# Patient Record
Sex: FEMALE | Race: WHITE | NOT HISPANIC OR LATINO
[De-identification: names, ages, dates, MRNs, and addresses within clinical notes are randomized per-mention and may not be internally consistent; named-entity substitution may affect disease eponyms.]

---

## 2020-11-09 DIAGNOSIS — R41.3 OTHER AMNESIA: ICD-10-CM

## 2020-11-19 ENCOUNTER — NON-APPOINTMENT (OUTPATIENT)
Age: 80
End: 2020-11-19

## 2020-11-19 DIAGNOSIS — Z82.49 FAMILY HISTORY OF ISCHEMIC HEART DISEASE AND OTHER DISEASES OF THE CIRCULATORY SYSTEM: ICD-10-CM

## 2020-11-19 DIAGNOSIS — Z82.0 FAMILY HISTORY OF EPILEPSY AND OTHER DISEASES OF THE NERVOUS SYSTEM: ICD-10-CM

## 2020-11-19 DIAGNOSIS — E78.5 HYPERLIPIDEMIA, UNSPECIFIED: ICD-10-CM

## 2020-11-19 DIAGNOSIS — Z80.7 FAMILY HISTORY OF OTHER MALIGNANT NEOPLASMS OF LYMPHOID, HEMATOPOIETIC AND RELATED TISSUES: ICD-10-CM

## 2020-11-19 DIAGNOSIS — R32 UNSPECIFIED URINARY INCONTINENCE: ICD-10-CM

## 2020-11-19 DIAGNOSIS — F43.9 REACTION TO SEVERE STRESS, UNSPECIFIED: ICD-10-CM

## 2020-11-19 DIAGNOSIS — I48.0 PAROXYSMAL ATRIAL FIBRILLATION: ICD-10-CM

## 2020-11-19 DIAGNOSIS — Z85.89 PERSONAL HISTORY OF MALIGNANT NEOPLASM OF OTHER ORGANS AND SYSTEMS: ICD-10-CM

## 2020-11-19 DIAGNOSIS — R35.0 FREQUENCY OF MICTURITION: ICD-10-CM

## 2021-01-15 ENCOUNTER — APPOINTMENT (OUTPATIENT)
Dept: NEUROLOGY | Facility: CLINIC | Age: 81
End: 2021-01-15
Payer: MEDICARE

## 2021-01-15 VITALS
HEIGHT: 63 IN | HEART RATE: 64 BPM | DIASTOLIC BLOOD PRESSURE: 81 MMHG | SYSTOLIC BLOOD PRESSURE: 152 MMHG | BODY MASS INDEX: 35.79 KG/M2 | WEIGHT: 202 LBS

## 2021-01-15 VITALS — TEMPERATURE: 97.5 F

## 2021-01-15 PROCEDURE — 99213 OFFICE O/P EST LOW 20 MIN: CPT

## 2021-01-15 NOTE — CONSULT LETTER
[Dear  ___] : Dear  [unfilled], [Consult Letter:] : I had the pleasure of evaluating your patient, [unfilled]. [Please see my note below.] : Please see my note below. [Consult Closing:] : Thank you very much for allowing me to participate in the care of this patient.  If you have any questions, please do not hesitate to contact me. [Sincerely,] : Sincerely, [FreeTextEntry3] : .Signature

## 2021-01-15 NOTE — HISTORY OF PRESENT ILLNESS
[FreeTextEntry1] : Mrs. Renetta Cohen was last evaluated on August 3, 2020 via telemedicine. She is an 80-year-old right-handed patient with familial tremor, atrial fibrillation, prediabetes, hyperlipidemia and mild subacute amnestic symptoms. Imaging revealed chronic microvascular ischemic changes and a right frontal calvarial lesion. This prompted a follow-up MRI of the brain performed in mid September 2019. That study revealed an unchanged enhancing lesion in the right frontal bone possibly representing a hemangioma. A six-month follow-up was recommended.\par \par Mrs. Cohen complains of worsening anxiety, more memory loss and tremor.\par \par She is presently on metoprolol 25 mg daily, Xarelto, donepezil 5 mg daily, losartan and pravastatin.  She takes HCTZ as needed for lower extremity edema.

## 2021-01-15 NOTE — DATA REVIEWED
[de-identified] : Blood tests were reviewed from October 28, 2020.  Hemoglobin was 11.8.  Sedimentation rate was 30.  Creatinine was 1.29.  Glucose was 147.  Hepatic functions were normal.  CPK was 73. LDL was 120.  Hemoglobin A1c was 6.7.  Vitamin B12 was 597.  TSH was 3.14.  C-reactive protein was 2.58.  Covid–19 IgG antibody was negative.

## 2021-01-15 NOTE — ASSESSMENT
[FreeTextEntry1] : Mrs. Cohen is an 80-year-old with mild cognitive impairment, amnestic type, severe anxiety and an essential or possibly familial tremor.  Her calvarial lesion is likely benign.  She declines updated imaging.  I cannot entirely exclude a diagnosis of Parkinson's disease but this seems less likely.  I suspect that her cognitive complaints are due to a combination of severe anxiety and possibly cerebrovascular disease.  Another neurodegenerative process cannot be totally excluded.\par \par She is hesitant to increase her donepezil dose.  I suggested cautious observation.  She seems to be functioning rather well under the circumstances of the pandemic.  I suggested a routine follow-up visit in 6 months.  Telephone contact will be maintained in the meantime.

## 2021-01-15 NOTE — PHYSICAL EXAM
[FreeTextEntry1] : Constitutional:  Patient was well-developed, well-nourished and in no acute distress. \par \par Head:  Normocephalic, atraumatic. Tympanic membranes were clear. \par \par Neck:  Supple with full range of motion. \par \par Cardiovascular:  Cardiac rhythm was regular without murmur. There were no carotid bruits. Peripheral pulses were full and symmetric. \par \par Respiratory:  Lungs were clear. \par \par Abdomen:  Soft and nontender. \par \par Spine:  Nontender. \par \par Skin:  There were no rashes. \par \par NEUROLOGICAL EXAMINATION:\par \par Mental Status: Patient was alert and oriented. Speech was fluent. There was a mild vocal tremor.  She scored 27 out of 30 on MMSE making 3 errors in recall.  She scored 29 out of 30 at her last visit.\par \par Cranial Nerves: \par \par II: She could finger count bilaterally. Pupils were equal and reactive. Visual fields were full. Funduscopic examination was normal. \par \par III, IV, VI:  Eye movements were full without nystagmus. \par \par V: Facial sensation was intact. \par \par VII: Facial strength was normal. \par \par VIII: Hearing was equal. \par \par IX, X: Palatal movement was normal. Phonation was normal. \par \par XI: Sternocleidomastoids and trapezii were normal. \par \par XII: Tongue was midline and movements normal. There was no lingual atrophy or fasciculations. \par \par Motor Examination: Muscle bulk, tone and strength were normal.  There were very prominent right greater than left postural and at times rest tremors.  There were also action tremors.\par \par Sensory Examination: Pinprick, vibration and joint position sense were intact. \par \par Reflexes: DTRs were 2 throughout. \par \par Plantar Responses: Plantar responses were flexor. \par \par Coordination/Cerebellar Function: There was no dysmetria on finger to nose or heel to shin testing. \par \par Gait/Stance: Gait small stepped and turns decomposed.  Romberg was negative.  Tandem was not tested.\par

## 2021-10-22 ENCOUNTER — APPOINTMENT (OUTPATIENT)
Dept: NEUROLOGY | Facility: CLINIC | Age: 81
End: 2021-10-22

## 2021-12-22 ENCOUNTER — APPOINTMENT (OUTPATIENT)
Dept: NEUROLOGY | Facility: CLINIC | Age: 81
End: 2021-12-22
Payer: MEDICARE

## 2021-12-22 VITALS
HEIGHT: 63 IN | DIASTOLIC BLOOD PRESSURE: 82 MMHG | WEIGHT: 203 LBS | SYSTOLIC BLOOD PRESSURE: 140 MMHG | BODY MASS INDEX: 35.97 KG/M2 | HEART RATE: 66 BPM

## 2021-12-22 DIAGNOSIS — Z86.73 PERSONAL HISTORY OF TRANSIENT ISCHEMIC ATTACK (TIA), AND CEREBRAL INFARCTION W/OUT RESIDUAL DEFICITS: ICD-10-CM

## 2021-12-22 DIAGNOSIS — D18.00 HEMANGIOMA UNSPECIFIED SITE: ICD-10-CM

## 2021-12-22 DIAGNOSIS — I10 ESSENTIAL (PRIMARY) HYPERTENSION: ICD-10-CM

## 2021-12-22 DIAGNOSIS — E11.9 TYPE 2 DIABETES MELLITUS W/OUT COMPLICATIONS: ICD-10-CM

## 2021-12-22 DIAGNOSIS — R45.4 IRRITABILITY AND ANGER: ICD-10-CM

## 2021-12-22 DIAGNOSIS — R47.89 OTHER SPEECH DISTURBANCES: ICD-10-CM

## 2021-12-22 PROCEDURE — 99215 OFFICE O/P EST HI 40 MIN: CPT

## 2021-12-22 PROCEDURE — 96116 NUBHVL XM PHYS/QHP 1ST HR: CPT | Mod: 59

## 2021-12-22 PROCEDURE — 95806 SLEEP STUDY UNATT&RESP EFFT: CPT

## 2021-12-22 RX ORDER — METFORMIN HYDROCHLORIDE 625 MG/1
TABLET ORAL
Refills: 0 | Status: COMPLETED | COMMUNITY
End: 2021-12-22

## 2021-12-22 RX ORDER — B-COMPLEX WITH VITAMIN C
TABLET ORAL
Refills: 0 | Status: ACTIVE | COMMUNITY

## 2021-12-22 RX ORDER — DONEPEZIL HYDROCHLORIDE 5 MG/1
5 TABLET ORAL DAILY
Qty: 90 | Refills: 0 | Status: COMPLETED | COMMUNITY
Start: 2020-11-09 | End: 2021-12-22

## 2021-12-22 RX ORDER — DONEPEZIL HYDROCHLORIDE 5 MG/1
5 TABLET ORAL
Qty: 90 | Refills: 3 | Status: COMPLETED | COMMUNITY
Start: 2021-02-08 | End: 2021-12-22

## 2021-12-22 RX ORDER — PRAVASTATIN SODIUM 80 MG/1
TABLET ORAL
Refills: 0 | Status: COMPLETED | COMMUNITY
End: 2021-12-22

## 2021-12-22 RX ORDER — ADHESIVE TAPE 3"X 2.3 YD
50 MCG TAPE, NON-MEDICATED TOPICAL
Refills: 0 | Status: ACTIVE | COMMUNITY

## 2021-12-22 NOTE — ASSESSMENT
[FreeTextEntry1] : The patient is an 81 year old right handed woman with an episode of rapid forgetting in 2016 that was possibly associated with other mild chronic memory changes vs isolated incident, but since 2018 more clear cognitive decline noted primarily affecting memory. Personality more withdrawn, less sure of herself as well.\par B/l hand tremor is more longstanding since approx 2005.\par As of 12'21 mild decline in IADLs.\par On neurobehavioral status testing 12'21 she had deficits in working memory, executive function, processing speed overall seemed ok. Conversational memory was good but on testing very impaired registration, dec recall, but it fully responded to cues without false positives and she was fully oriented. sematnic fluency however was worse than phonemic and she had anomia in conversation and testing that responded to cues. No apraxia aside from a meaningless gesture. \par \par Counseled that she has dementia of mild stage. Cogntiive pattern seems more dysexecutive and not amnestic, though language changes noted. Suspect vascular dementia as etiology, think lower suspicion for comorbid AD, but i rec neuropsyhcological testing to better clarify. MRI shows severe microvascular ischemic disease as well as R BG infarct which we reviewed together. \par \par counseled on lack of fda approved drugs in vascular dementia. Aricept he doestn think caused any benefit. Discussed that theres inc risk of GIB in conjunction with her xarelto. They prefer to discontinue which i agree with.\par \par She snores rec r/o CASTRO\par \par Re: tremor, has been dx'ed as essential tremor which seems fitting, though some rest tremor was also noted. She doesn’t have any parkinsonian gait or bradykinesia or visual hallucinations or rem sleep behavior disorder symptomst that would suggest she instead as dementia with lewy bodies. Essential tremor can also contribute to cognitive changes.\par \par She may have ckd which could also contribute.\par \par She has longstanding anxiety, worse in recent years, not on medication or in therapy. Discussed how that could worsen symptoms and needs to be adddressed. \par \par Counseled on strategies for maintaining cognitive health. \par

## 2021-12-22 NOTE — PROCEDURE
[FreeTextEntry1] : EXTENDED NEUROBEHAVIORAL STATUS TESTING\par \par [This is a separate procedure note for the Neurobehavioral Status Examination that was performed during the encounter]. \par \par The patient was alert, well groomed, NAD. She was fluent without paraphasic errors. there was some anomia in conversation with circumlocutions at times. Comprehension was intact. processing wasn’t definitely slowed in converation. she was active in the diuscssion and give her history. she was euthymic overall but had test anxiety noted. \par she had tremor noted with action >rest but it increaed during cognigive testing.\par she asked appropr questions during counseling\par \par lost train of thought once in conversation\par can say has 13 grandkids. asked to name youngest 3 and she could\par \par recent memory: chose omicron from list, couldn’t get with cue. could name covid without cues. weekend events stayed home, could give some specifics. could name biden. last holiday knew was thansgiving, didn’t celebrate.\par \par MoCA (version 8.1) score out of 30: 14\par Memory index score (MIS) out of 15: 8\par Visuospatial/Executive \par 	Trails: (-1) tremor noted\par 	Cube: (-1) tremor noted, piecemeal, too close to copy\par 	Clock: (-2) small clock, numbers shifted only fit up to 7. when drawn for her she put line connected 10 and 11\par Naming: (-1) rhino got with phonemic cue\par Memory- registration: impared. 1/5, 2/5 then did indivi butstill only 3/5 best\par Attention \par 	Digit span 5F: intact\par 	Digit span 3R: intact\par 	Letter A test: intact 1 miss\par 	Serial 7 subtraction: (-3) 0/5\par Language \par 	Phrase repetition: (-2)\par 	F word fluency- # words: (-1) 10 and 2 rpts and 1 rule violation phone\par Abstraction- banana/orange not similar- with promtp she got fruit\par 	Train/bicycle: (-1) move\par 	Watch/ruler: measurement\par Delayed recall score out of 5: (-4) 1\par 	Additional words recalled with category cue: 1\par 	Additional words recalled with multiple choice cue: 3\par Orientation: intact including street number but not name. for yr said 2 2 2 1 and looked like she knew she was wrong, but when given choices she picked 2021 so i gave credit\par \par Additional neurobehavioral status tests:\par Animal naming fluency- # words: 4 and 1 rpt\par Sentence written: Im writing a sentence [messy with tremor, not micrographic]\par Praxis\par       Ideomotor limb\par             Transitive\par                     Comb hair: intact b/l\par 	    Brush teeth: intact b/l\par             Intransitive\par 	    Wave goodbye: intact b/l\par 	    Motion "come here": intact b/l\par       Meaningless gestures: intact 3/4 (not crossed fingers)\par Right/Left orientation\par 	"Show me your right hand": correct \par 	"Show me your left hand": correct \par 	"With your right hand touch your left ear": correct\par 	wheres my left shoulder: correct\par \par INTERPRETATION: \par \par I carefully reviewed the above results of neurobehavioral status testing. The cognitive domains are listed below with my interpretation of whether or not there is an impairment or if performance was within normal limits. \par It should be noted that this testing is distinct from standard neuropsychological testing, which compares the patient's raw scores on different validated batteries of tests to those of their age-matched peers. Therefore subtle deficits may not be apparent on this testing, or instead some incorrect responses may overestimate deficits.\par \par Cognitive domains:\par 	Attention: error with sustained attention. lost train of thought in conversation once though prompted by being anomic at first\par 	Working memory: impaired\par 	Executive function: impaired set sfhiting, exec contorl of figure copy, abstraction.\par 	Language: worse semantic than phonemic fluency. anomia in converation responded to cues, and on testing\par 	Memory: good in conversation. on testing impaired registration and dec recall but fully repsonded to cues without false psotives. rpts duering fluency tasks\par 	Visuospatial function: dec exec control\par 	Praxis: intact aside from a meaningless gesture\par 	Behavior/Mood: appropriate/euthymic aside from test anxiety, flattened affect\par 	Other comments: processing overall seemed ok. fully oriented.\par                 Score: MoCA 14/30 mis 8/15\par \par Please refer to the assessment section of this encounter that documents how to incorporate the interpretation of these results into the patient's diagnosis and plan of care.\par \par 35 min were taken to administer and interpret this extended neurobehavioral evaluation and prepare the report. \par \par Testing start time: 11:10a\par Testing stop time: 11:30a\par Report time: 15 min\par

## 2021-12-22 NOTE — PHYSICAL EXAM
[FreeTextEntry1] : General appearance: The patient is awake and alert, in no acute distress.\par Eyes: PERRL, moist conjunctiva, no scleral icterus.\par ENT: Oropharynx clear of any exudate or lesions. Dentition unremarkable. No lesions on lips or gums.\par Neck: supple, trachea midline. \par Pulmonary: Normal respiratory effort, no audible wheezing.\par Cardiac: Regular rate and rhythm. \par Vascular: No peripheral edema.\par Musculoskeletal: Gait and strength as noted in "Neurologic Examination" below. No clubbing or cyanosis. Normal range of motion.\par Skin: Warm and dry. No rashes or lesions. No bruising.\par Neurologic: See separate "Neurologic Examination" below.\par Psychiatric: Mood, affect and orientation as noted below in "Extended Neurobehavioral Examination".\par \par \par  NEUROLOGIC EXAMINATION\par \par Cranial Nerves: \par visual fields full to confrontation\par pupils equal round and reactive to light\par extraocular motion intact without nystagmus- including vertical gaze\par facial sensation intact symmetrically\par face symmetrical\par hearing intact to conversation bilaterally\par palate raises symmetrically, head turning and shoulder shrug symmetric, tongue midline without deviation with protrusion.\par No dysarthria.\par mild vocal tremor\par Motor: muscle tone normal\par            no pronator drift but b/l arms don’t supinate fully (she says from shoulder arthritis)\par            fine finger movements symmetric and good speed\par            muscle strength normal in all 4 extremities and normal bulk in all 4 extremities\par Sensory exam: light touch was intact.\par Coordination: had tremor- noted bl/ at rest, but more with action. holding cup of pens noise emerged after a moment with outstreched hand, b/l and sounded similar. intention tremor, postural tremor.\par                       no dysmetria\par Gait: steady with a narrow base, more tremor on L hand with gait. turn was good. speed decent.\par Deep tendon reflexes: 2+ at brachioradialis, biceps, triceps, and 0 patellar bilaterally\par Primitive reflexes: No grasp reflex. \par Cortical Sensory signs:  No extinction to double simultaneous stimulation.\par \par \par  Activities of Daily Living ([place]): independent vs. needs some help vs. unable/needs major assistance.      \par            --responses were the following: except where noted, indep                                          \par 1. Bathing/showering\par 2. Dressing\par 3. Toileting\par 4. Transferring\par 5. Continence\par 6. Feeding                                                                                                                                                           \par \par Instrumental Activities of Daily Living ([place]onan-Alex): independent vs. needs some help vs. unable/needs major assistance.     \par            --responses were the following: except where noted, indep\par 1. Ability to use telephone- [facility]\par 2. Shopping\par 3. Food preparation\par 4. Housekeeping\par 5. Laundry\par 6. Transportation (public/arranging rides/driving)\par 7. Responsibility for own medications\par 8. Ability to handle finances - [facility]

## 2021-12-22 NOTE — DATA REVIEWED
[de-identified] : \par zp mri 3/30/19 (memory loss) advanced microvascular ischemic disease . rf indeterminate lesion\par i reviewed and agree, see deep sylvian fissures. gre neg. RBG infarct noted old\par \par zp mri 9/12/19 with cont: tf enhnacnign lesion hemangioma possible. advnaced microvascular ischemic disease . rbg lacunar infart\par i agree\par \par ZP mri 1/28/21  nl vents and sulci. advanced microvascular ischemic disease , no change dfrom prior. R BG chornic lacunar infarct.  R F calvarium lesions simlar to priorn. LF scalp lipoma. \par i reviewed and see inc in the microvascular ischemic disease from 2019. mild gen atrophy inc from prior. gre neg.\par neuroquant with 99% for white matter hyperintensities, caudate. 1% nucleus accumbens, 1% L fusiform, 1% transverse/superior temp, 1% medial orbitforonatl, nl hippocampi
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
## 2021-12-30 ENCOUNTER — APPOINTMENT (OUTPATIENT)
Dept: NEUROLOGY | Facility: CLINIC | Age: 81
End: 2021-12-30
Payer: MEDICARE

## 2021-12-30 PROCEDURE — 95816 EEG AWAKE AND DROWSY: CPT

## 2022-01-03 ENCOUNTER — NON-APPOINTMENT (OUTPATIENT)
Age: 82
End: 2022-01-03

## 2022-01-24 ENCOUNTER — NON-APPOINTMENT (OUTPATIENT)
Age: 82
End: 2022-01-24

## 2022-03-30 ENCOUNTER — APPOINTMENT (OUTPATIENT)
Dept: NEUROLOGY | Facility: CLINIC | Age: 82
End: 2022-03-30
Payer: MEDICARE

## 2022-03-30 PROCEDURE — 96116 NUBHVL XM PHYS/QHP 1ST HR: CPT

## 2022-03-30 PROCEDURE — 96133 NRPSYC TST EVAL PHYS/QHP EA: CPT

## 2022-03-30 PROCEDURE — 96132 NRPSYC TST EVAL PHYS/QHP 1ST: CPT

## 2022-03-30 PROCEDURE — 96139 PSYCL/NRPSYC TST TECH EA: CPT | Mod: NC

## 2022-03-30 PROCEDURE — 96121 NUBHVL XM PHY/QHP EA ADDL HR: CPT

## 2022-03-30 PROCEDURE — 96138 PSYCL/NRPSYC TECH 1ST: CPT | Mod: NC

## 2022-04-04 ENCOUNTER — APPOINTMENT (OUTPATIENT)
Dept: ORTHOPEDIC SURGERY | Facility: CLINIC | Age: 82
End: 2022-04-04
Payer: MEDICARE

## 2022-04-04 VITALS — WEIGHT: 202 LBS | BODY MASS INDEX: 35.79 KG/M2 | HEIGHT: 63 IN

## 2022-04-04 DIAGNOSIS — M19.012 PRIMARY OSTEOARTHRITIS, LEFT SHOULDER: ICD-10-CM

## 2022-04-04 PROCEDURE — 20610 DRAIN/INJ JOINT/BURSA W/O US: CPT | Mod: LT

## 2022-04-04 PROCEDURE — 99212 OFFICE O/P EST SF 10 MIN: CPT | Mod: 25

## 2022-04-04 RX ORDER — HYALURONATE SODIUM 20 MG/2 ML
20 SYRINGE (ML) INTRAARTICULAR
Refills: 0 | Status: COMPLETED | OUTPATIENT
Start: 2022-04-04

## 2022-04-04 RX ADMIN — Medication 0 MG/2ML: at 00:00

## 2022-04-04 NOTE — PROCEDURE
[Left] : of the left [Shoulder] : shoulder [Euflexxa] : Euflexxa [#3] : series #3 [Large Joint Injection] : Large joint injection

## 2022-04-06 ENCOUNTER — APPOINTMENT (OUTPATIENT)
Dept: NEUROLOGY | Facility: CLINIC | Age: 82
End: 2022-04-06
Payer: MEDICARE

## 2022-04-06 PROCEDURE — 96139 PSYCL/NRPSYC TST TECH EA: CPT | Mod: NC

## 2022-04-06 PROCEDURE — 96133 NRPSYC TST EVAL PHYS/QHP EA: CPT

## 2022-04-06 NOTE — HISTORY OF PRESENT ILLNESS
[Left Arm] : left arm [Gradual] : gradual [8] : 8 [5] : 5 [Dull/Aching] : dull/aching [Intermittent] : intermittent [Sleep] : sleep [Meds] : meds [Exercising] : exercising [3] : 3 [Euflexxa] : Euflexxa [] : no [FreeTextEntry1] : shoulder [de-identified] : 01/06/2022 - 03/30/2022 [de-identified] : 03/28/2022 [TWNoteComboBox1] : 60%

## 2022-04-18 ENCOUNTER — APPOINTMENT (OUTPATIENT)
Dept: ORTHOPEDIC SURGERY | Facility: CLINIC | Age: 82
End: 2022-04-18
Payer: MEDICARE

## 2022-04-18 VITALS — WEIGHT: 200 LBS | HEIGHT: 63 IN | BODY MASS INDEX: 35.44 KG/M2

## 2022-04-18 DIAGNOSIS — T84.84XA PAIN DUE TO INTERNAL ORTHOPEDIC PROSTHETIC DEVICES, IMPLANTS AND GRAFTS, INITIAL ENCOUNTER: ICD-10-CM

## 2022-04-18 DIAGNOSIS — Z96.652 PAIN DUE TO INTERNAL ORTHOPEDIC PROSTHETIC DEVICES, IMPLANTS AND GRAFTS, INITIAL ENCOUNTER: ICD-10-CM

## 2022-04-18 DIAGNOSIS — M25.562 PAIN IN LEFT KNEE: ICD-10-CM

## 2022-04-18 PROCEDURE — 73562 X-RAY EXAM OF KNEE 3: CPT | Mod: LT

## 2022-04-18 PROCEDURE — 99214 OFFICE O/P EST MOD 30 MIN: CPT

## 2022-04-20 NOTE — PHYSICAL EXAM
[Left] : left knee [FreeTextEntry3] : LEFT KNEE- No effusion There is no laxity throughout  the entire range. ROM is form 0-120 degrees. A step climb can be performed well.  A  step decent was very painful and produced apprehension that prevented the rest of the exam.

## 2022-04-20 NOTE — HISTORY OF PRESENT ILLNESS
[Gradual] : gradual [8] : 8 [0] : 0 [Dull/Aching] : dull/aching [Sharp] : sharp [Intermittent] : intermittent [Rest] : rest [Walking] : walking [Stairs] : stairs [de-identified] : 11/19/20: Ms. Renetta Cohen, a 80-year-old female, presents today for b/l shoulders. She reports that she has been experiencing b/l shoulder pain over the past few months. She reports that her pain can be R>L. She reports that her pain is aggravated with use and movement of the arms. She reports that reaching backwards is painful and difficult for her.\par *SA injection for the right shoulder today - immediate lidocaine response.\par \par 12/10/20: Pt presents for f/up. She reports that she obtained relief after the SA injection. She reports continuing left\par shoulder pain. Left shoulder SA injection was discussed at the previous visit. *SA injection for the left shoulder today.\par \par 6/21/2021: Pt presents today F/Up b/L shoulder:\par Pt reports that she obtained relief after the SA injection. She reports continuing left shoulder pain. Pt reports pain in\par the left hand stiffness occasional pain.\par ***SA injection for the B/L shoulder done today***immediate lidocaine response.\par \par 9/21/2021:Pt presents today F/Up b/L shoulder. She reports continuing left shoulder pain. Pt reports no improvement with injection from previous injection.\par \par 1/6/22: PT has complaints of B/L shoulder pain.PT state that the symptoms have gotten worse since the last visit. PT\par experiences similar pain at rest and with movement. PT states that the pain keeps her awake at night but is alleviated\par Tylenol. PT is on Xarelto \par \par 3/21/2022:F/U B/L shoulder \par PT/report continuous shoulder pain. Pt is interested in euflexxa injection left shoulder today.\par (L) shoulder euflexxa injection #1\par \par \par 03/28/2022: (L) shoulder euflexxa injection #2\par \par 04/04/2022: (L) Shoulder Euflexxa injection #3\par \par 04/18/2022: Pt C.O. Left knee pain which started about a week ago. Pt has a HX of Bilateral knee replacement, the RT Knee was done 12/17/11. Left knee was done 2/11/15. Pt denies any traumatic re-injury. Symptom stared about 7 to 10 days ago. [] : no [FreeTextEntry1] : Rt Knee

## 2022-04-26 ENCOUNTER — APPOINTMENT (OUTPATIENT)
Dept: NEUROLOGY | Facility: CLINIC | Age: 82
End: 2022-04-26
Payer: MEDICARE

## 2022-04-26 PROCEDURE — 96132 NRPSYC TST EVAL PHYS/QHP 1ST: CPT | Mod: 95

## 2022-04-27 ENCOUNTER — APPOINTMENT (OUTPATIENT)
Dept: ORTHOPEDIC SURGERY | Facility: CLINIC | Age: 82
End: 2022-04-27

## 2022-04-28 ENCOUNTER — APPOINTMENT (OUTPATIENT)
Dept: NEUROLOGY | Facility: CLINIC | Age: 82
End: 2022-04-28
Payer: MEDICARE

## 2022-04-28 VITALS
DIASTOLIC BLOOD PRESSURE: 82 MMHG | BODY MASS INDEX: 36.14 KG/M2 | HEIGHT: 63 IN | SYSTOLIC BLOOD PRESSURE: 170 MMHG | WEIGHT: 204 LBS | HEART RATE: 67 BPM

## 2022-04-28 DIAGNOSIS — G47.33 OBSTRUCTIVE SLEEP APNEA (ADULT) (PEDIATRIC): ICD-10-CM

## 2022-04-28 PROCEDURE — 99215 OFFICE O/P EST HI 40 MIN: CPT

## 2022-04-28 RX ORDER — DONEPEZIL HYDROCHLORIDE 5 MG/1
5 TABLET ORAL
Qty: 90 | Refills: 3 | Status: COMPLETED | COMMUNITY
Start: 2021-07-16 | End: 2022-04-28

## 2022-04-28 NOTE — DATA REVIEWED
[de-identified] : \par zp mri 3/30/19 (memory loss) advanced microvascular ischemic disease . rf indeterminate lesion\par i reviewed and agree, see deep sylvian fissures. gre neg. RBG infarct noted old\par \par zp mri 9/12/19 with cont: tf enhnacnign lesion hemangioma possible. advnaced microvascular ischemic disease . rbg lacunar infart\par i agree\par \par ZP mri 1/28/21  nl vents and sulci. advanced microvascular ischemic disease , no change dfrom prior. R BG chornic lacunar infarct.  R F calvarium lesions simlar to priorn. LF scalp lipoma. \par i reviewed and see inc in the microvascular ischemic disease from 2019. mild gen atrophy inc from prior. gre neg.\par neuroquant with 99% for white matter hyperintensities, caudate. 1% nucleus accumbens, 1% L fusiform, 1% transverse/superior temp, 1% medial orbitforonatl, nl hippocampi [de-identified] : \par eeg 12/30/21 mild slowing, no epileptiform activity [de-identified] : \par neuropsych testing Dr. Dobson 3/30/22 and 4/6/22 and feedback session 4/26/22. see hpi for details. impression was dementia fitting with AD [de-identified] : \par Neurobehavioral status testing 12/22/21\par Cognitive domains:\par 	Attention: error with sustained attention. lost train of thought in conversation once though prompted by being anomic at first\par 	Working memory: impaired\par 	Executive function: impaired set sfhiting, exec contorl of figure copy, abstraction.\par 	Language: worse semantic than phonemic fluency. anomia in converation responded to cues, and on testing\par 	Memory: good in conversation. on testing impaired registration and dec recall but fully repsonded to cues without false psotives. rpts duering fluency tasks\par 	Visuospatial function: dec exec control\par 	Praxis: intact aside from a meaningless gesture\par 	Behavior/Mood: appropriate/euthymic aside from test anxiety, flattened affect\par 	Other comments: processing overall seemed ok. fully oriented.\par  Score: MoCA 14/30 mis 8/15\par \par

## 2022-04-28 NOTE — ASSESSMENT
[FreeTextEntry1] : The patient is an 82 year old right handed woman with an episode of rapid forgetting in 2016 that was possibly associated with other mild chronic memory changes vs isolated incident, but since 2018 more clear cognitive decline noted primarily affecting memory. Personality more withdrawn, less sure of herself as well.\par B/l hand tremor is more longstanding since approx 2005.\par As of 12'21 mild decline in IADLs.\par On neurobehavioral status testing 12'21 she had deficits in working memory, executive function, processing speed overall seemed ok. Conversational memory was good but on testing very impaired registration, dec recall, but it fully responded to cues without false positives and she was fully oriented. sematnic fluency however was worse than phonemic and she had anomia in conversation and testing that responded to cues. No apraxia aside from a meaningless gesture. \par \par neuropsychological testing 3'44 noted impairedments in processing speed, exec, praxis, vsp, but yair in learning and memory with rapid forgetting, also impaired semantics and worse semantic than phonemic fluency.\par \par Counseled that she has dementia of mild stage. Cogntiive pattern I had thought seemed dysexecutive and not amnestic, but language changes were noted, and neuropsyhcological eval did show amnestic memory pattern. .  MRI showed severe microvascular ischemic disease as well as R BG infarct which we reviewed together and would be c/w vascular dementia, but possibly also has comorbid AD vs FTD spectrum-- AD suggested by memory though they noted with the semantic loss can't r/o FTD (svPPA). rec fdg pet scan to clarify. \par \par reviewed natural hx of mixed dementia, unpredictable course that can occur. reviewed fda approved drugs in AD, the lack of fda approved drugs in vascular dementia. Aricept he doestn think caused any benefit. Discussed that theres inc risk of GIB in conjunction with her xarelto. They preferred to discontinue which i agreed with, no worse off of it. if had comorbid AD would defer namenda use until mod-severe stage dementia.\par \par She snores, hst showed mild denis but with significant desat, needs tx\par \par Re: tremor, has been dx'ed as essential tremor which seems fitting, though some rest tremor was also noted. She doesn’t have any parkinsonian gait or bradykinesia or visual hallucinations or rem sleep behavior disorder symptomst that would suggest she instead as dementia with lewy bodies. Essential tremor can also contribute to cognitive changes.\par \par She may have ckd which could also contribute.\par \par She has longstanding anxiety, worse in recent years, not on medication or in therapy. Discussed how that could worsen symptoms and needs to be adddressed. \par \par Counseled on strategies for maintaining cognitive health. Counseled on safety concerns. \par
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
## 2022-05-16 ENCOUNTER — TRANSCRIPTION ENCOUNTER (OUTPATIENT)
Age: 82
End: 2022-05-16

## 2022-05-23 ENCOUNTER — APPOINTMENT (OUTPATIENT)
Dept: PULMONOLOGY | Facility: CLINIC | Age: 82
End: 2022-05-23
Payer: MEDICARE

## 2022-05-23 VITALS
WEIGHT: 210 LBS | BODY MASS INDEX: 37.21 KG/M2 | TEMPERATURE: 98.2 F | RESPIRATION RATE: 16 BRPM | SYSTOLIC BLOOD PRESSURE: 162 MMHG | DIASTOLIC BLOOD PRESSURE: 78 MMHG | HEART RATE: 83 BPM | HEIGHT: 63 IN

## 2022-05-23 PROCEDURE — 99204 OFFICE O/P NEW MOD 45 MIN: CPT | Mod: GC

## 2022-05-24 NOTE — HISTORY OF PRESENT ILLNESS
[Snoring] : snoring [Frequent Nocturnal Awakening] : frequent nocturnal awakening [Unintentional Sleep While Inactive] : unintentional sleep while inactive [Awakes Unrefreshed] : awakening unrefreshed [Awakening With Dry Mouth] : awakening with dry mouth [Hypersomnolence] : hypersomnolence [To Bed: ___] : ~he/she~ goes to bed at [unfilled] [Arises: ___] : arises at [unfilled] [Sleep Onset Latency: ___ minutes] : sleep onset latency of [unfilled] minutes reported [Nocturnal Awakenings: ___] : ~he/she~ typically has [unfilled] nocturnal awakenings [WASO: ___] : Wake time after sleep onset is [unfilled] [TST: ___] : Total sleep time is [unfilled] [Daytime Sleep: ___] : daytime sleep: [unfilled] [FreeTextEntry1] : 81 y/o female (COVID-19 MODERNA VACCINATED + booster) with PMHx significant for Atrial Fibrillation (on Xarelto), HTN & progressive dementia presents as new patient to sleep clinic for evaluation of CASTRO. Patient had a home sleep test as part of her workup for recent memory issues on 12/22/21, which showed an JULISSA of 7.1 with a T-90 of 10% (outside study, [facility]). \par \par Allergies: NKDA \par Home Meds: Diltiazem 120mg Q24, HCTZ PRN, Losartan Potassium TABS, Vitamin B Complex TABS, Vitamin D3 50 MCG (2000 UT) Oral Capsule & Xarelto \par Social hx: Denies tobacco smoking, E-cig/vaping, illicit drug use. 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.\par PSHx:  Cataract surgery, Excision of squamous cell carcinoma, Hysterectomy,  Knee Replacement & Tonsillectomy\par FMHx: Asthma (mother) & HTN (father)  [Witnessed Apneas] : no witnessed sleep apnea [Unintentional Sleep while Active] : no unintentional sleep while active [Awakes with Headache] : no headache upon awakening [Recent  Weight Gain] : no recent weight gain [DIS] : no DIS [DMS] : no DMS [Unusual Sleep Behavior] : no unusual sleep behavior [Cataplexy] : no cataplexy [Sleep Paralysis] : no sleep paralysis [Hypnagogic Hallucinations] : no hallucinations when falling asleep [Hypnopompic Hallucinations] : no hallucinations when awakening [Lower Extremity Discomfort] : no lower extremity discomfort in evening or at bedtime

## 2022-05-24 NOTE — HISTORY OF PRESENT ILLNESS
[Snoring] : snoring [Frequent Nocturnal Awakening] : frequent nocturnal awakening [Unintentional Sleep While Inactive] : unintentional sleep while inactive [Awakes Unrefreshed] : awakening unrefreshed [Awakening With Dry Mouth] : awakening with dry mouth [Hypersomnolence] : hypersomnolence [To Bed: ___] : ~he/she~ goes to bed at [unfilled] [Arises: ___] : arises at [unfilled] [Sleep Onset Latency: ___ minutes] : sleep onset latency of [unfilled] minutes reported [Nocturnal Awakenings: ___] : ~he/she~ typically has [unfilled] nocturnal awakenings [WASO: ___] : Wake time after sleep onset is [unfilled] [TST: ___] : Total sleep time is [unfilled] [Daytime Sleep: ___] : daytime sleep: [unfilled] [FreeTextEntry1] : 83 y/o female (COVID-19 MODERNA VACCINATED + booster) with PMHx significant for Atrial Fibrillation (on Xarelto), HTN & progressive dementia presents as new patient to sleep clinic for evaluation of CASTRO. Patient had a home sleep test as part of her workup for recent memory issues on 12/22/21, which showed an JULISSA of 7.1 with a T-90 of 10% (outside study, [facility]). \par \par Allergies: NKDA \par Home Meds: Diltiazem 120mg Q24, HCTZ PRN, Losartan Potassium TABS, Vitamin B Complex TABS, Vitamin D3 50 MCG (2000 UT) Oral Capsule & Xarelto \par Social hx: Denies tobacco smoking, E-cig/vaping, illicit drug use. 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.\par PSHx:  Cataract surgery, Excision of squamous cell carcinoma, Hysterectomy,  Knee Replacement & Tonsillectomy\par FMHx: Asthma (mother) & HTN (father)  [Witnessed Apneas] : no witnessed sleep apnea [Unintentional Sleep while Active] : no unintentional sleep while active [Awakes with Headache] : no headache upon awakening [Recent  Weight Gain] : no recent weight gain [DIS] : no DIS [DMS] : no DMS [Unusual Sleep Behavior] : no unusual sleep behavior [Cataplexy] : no cataplexy [Sleep Paralysis] : no sleep paralysis [Hypnagogic Hallucinations] : no hallucinations when falling asleep [Hypnopompic Hallucinations] : no hallucinations when awakening [Lower Extremity Discomfort] : no lower extremity discomfort in evening or at bedtime

## 2022-05-24 NOTE — PHYSICAL EXAM
[General Appearance - Well Developed] : well developed [Normal Appearance] : normal appearance [Well Groomed] : well groomed [General Appearance - Well Nourished] : well nourished [Normal Conjunctiva] : the conjunctiva exhibited no abnormalities [Neck Appearance] : the appearance of the neck was normal [] : no respiratory distress [Musculoskeletal - Swelling] : no joint swelling seen [Nail Clubbing] : no clubbing of the fingernails [Cyanosis, Localized] : no localized cyanosis [1+ Pitting] : 1+  pitting [Skin Color & Pigmentation] : normal skin color and pigmentation [Cranial Nerves] : cranial nerves 2-12 were intact [Oriented To Time, Place, And Person] : oriented to person, place, and time [Impaired Insight] : insight and judgment were intact [Affect] : the affect was normal [Mood] : the mood was normal [FreeTextEntry1] : + resting tremor of upper extremity

## 2022-05-24 NOTE — ASSESSMENT
[FreeTextEntry1] : 81 y/o female (COVID-19 MODERNA VACCINATED + booster) with PMHx significant for Atrial Fibrillation (on Xarelto), HTN & progressive dementia presents as new patient to sleep clinic for evaluation of CASTRO. Patient had a home sleep test as part of her workup for recent memory issues on 12/22/21, which showed an JULISSA of 7.1 with a T-90 of 10% (outside study, [facility]).  However review of the oximetry tracing shows that the may be considerable artifact on this portion of the study which may affect results interpretation.\par \par ASSESSMENT/PLAN:\par # Progressive cognitive impairment with HST revealing mild degree of CASTRO; however, significant artifact was noted on oximetry tracing artifact; patient would benefit from in center PSG for accurate assessment; her cognitive impairment makes accurate HST less feasible.   Sleep disordered breathing may contribute to cognitive impairment and assessment is needed to determine if there is a potentially treatable factor contributing to her current decline in functional status.  The study performed at the [facility] sleep disorder center.\par -  to stay with patient overnight in sleep center to provide support \par \par \par \par

## 2022-05-24 NOTE — ASSESSMENT
[FreeTextEntry1] : 81 y/o female (COVID-19 MODERNA VACCINATED + booster) with PMHx significant for Atrial Fibrillation (on Xarelto), HTN & progressive dementia presents as new patient to sleep clinic for evaluation of CASTRO. Patient had a home sleep test as part of her workup for recent memory issues on 12/22/21, which showed an JULISSA of 7.1 with a T-90 of 10% (outside study, [facility]).  However review of the oximetry tracing shows that the may be considerable artifact on this portion of the study which may affect results interpretation.\par \par ASSESSMENT/PLAN:\par # Progressive cognitive impairment with HST revealing mild degree of CASTRO; however, significant artifact was noted on oximetry tracing artifact; patient would benefit from in center PSG for accurate assessment; her cognitive impairment makes accurate HST less feasible.   Sleep disordered breathing may contribute to cognitive impairment and assessment is needed to determine if there is a potentially treatable factor contributing to her current decline in functional status.  The study performed at the [facility] sleep disorder center.\par -  to stay with patient overnight in sleep center to provide support \par \par \par \par

## 2022-06-01 ENCOUNTER — FORM ENCOUNTER (OUTPATIENT)
Age: 82
End: 2022-06-01

## 2022-06-15 ENCOUNTER — APPOINTMENT (OUTPATIENT)
Dept: SLEEP CENTER | Facility: CLINIC | Age: 82
End: 2022-06-15

## 2022-06-16 ENCOUNTER — NON-APPOINTMENT (OUTPATIENT)
Age: 82
End: 2022-06-16

## 2022-06-20 ENCOUNTER — APPOINTMENT (OUTPATIENT)
Dept: PULMONOLOGY | Facility: CLINIC | Age: 82
End: 2022-06-20

## 2022-07-14 ENCOUNTER — OUTPATIENT (OUTPATIENT)
Dept: OUTPATIENT SERVICES | Facility: HOSPITAL | Age: 82
LOS: 1 days | End: 2022-07-14
Payer: MEDICARE

## 2022-07-14 ENCOUNTER — APPOINTMENT (OUTPATIENT)
Dept: SLEEP CENTER | Facility: CLINIC | Age: 82
End: 2022-07-14

## 2022-07-14 PROCEDURE — 95806 SLEEP STUDY UNATT&RESP EFFT: CPT

## 2022-07-14 PROCEDURE — 95806 SLEEP STUDY UNATT&RESP EFFT: CPT | Mod: 26

## 2022-08-02 ENCOUNTER — APPOINTMENT (OUTPATIENT)
Dept: PULMONOLOGY | Facility: CLINIC | Age: 82
End: 2022-08-02

## 2022-08-02 VITALS
HEIGHT: 63 IN | DIASTOLIC BLOOD PRESSURE: 67 MMHG | BODY MASS INDEX: 36.86 KG/M2 | RESPIRATION RATE: 16 BRPM | HEART RATE: 66 BPM | TEMPERATURE: 98 F | SYSTOLIC BLOOD PRESSURE: 140 MMHG | WEIGHT: 208 LBS

## 2022-08-02 DIAGNOSIS — G47.33 OBSTRUCTIVE SLEEP APNEA (ADULT) (PEDIATRIC): ICD-10-CM

## 2022-08-02 PROCEDURE — 99213 OFFICE O/P EST LOW 20 MIN: CPT | Mod: GC

## 2022-08-02 NOTE — ASSESSMENT
[FreeTextEntry1] : 83 y/o female (COVID-19 MODERNA VACCINATED + booster) with PMHx significant for Atrial Fibrillation (on Xarelto), HTN & progressive dementia presents as new patient to sleep clinic for evaluation of CASTRO. Recent HST shows an JULISSA of 7/0/hr with mild level of oxygen desaturation. This level of apnea and oxygen desaturation is unlikely to be significantly affecting her memory issues and mild cognitive impairment, however treatment could make a significant improvement in her symptoms of excessive sleepiness.\par \par 1) Mild CASTRO - We have suggested the patient return to our sleep center located at [address] [place] [address] for a CPAP acclimatization during the day to get her adjusted to CPAP. She is concerned about being able to tolerate a PAP mask, and will be allowed to try several different types of mask best suited for her comfort. She will then take the machine home for a CPAP acclimatization at home in order to get used to the machine and see if she can potentially tolerate therapy. We have discussed that her level of sleep apnea is unlikely to improve her cognitive dysfunction, but may have a significant impact in her sleepiness.\par \par She will follow up after CPAP acclimatization.\par \par \par

## 2022-08-02 NOTE — HISTORY OF PRESENT ILLNESS
[Snoring] : snoring [Frequent Nocturnal Awakening] : frequent nocturnal awakening [Unintentional Sleep While Inactive] : unintentional sleep while inactive [Awakes Unrefreshed] : awakening unrefreshed [Awakening With Dry Mouth] : awakening with dry mouth [Hypersomnolence] : hypersomnolence [To Bed: ___] : ~he/she~ goes to bed at [unfilled] [Arises: ___] : arises at [unfilled] [Sleep Onset Latency: ___ minutes] : sleep onset latency of [unfilled] minutes reported [Nocturnal Awakenings: ___] : ~he/she~ typically has [unfilled] nocturnal awakenings [WASO: ___] : Wake time after sleep onset is [unfilled] [TST: ___] : Total sleep time is [unfilled] [Daytime Sleep: ___] : daytime sleep: [unfilled] [FreeTextEntry1] : 81 y/o female (COVID-19 MODERNA VACCINATED + booster) with PMHx significant for Atrial Fibrillation (on Xarelto), HTN & progressive dementia presents as new patient to sleep clinic for evaluation of CASTRO. She was referred by neurology for complaints of excessive sleepiness and mild cognitive impairment.\par \par She had recent HST which reveals an JULISSA of 7.0/hr with a mild amount of oxygen desaturation (T90 6.7%, liana SpO2 69%). Reports she felt she slept well because she was at home. She does complain of excessive sleepiness despite adequate sleep time. She sleeps about 8-9 hours per night, sometimes up to 10, but she still feels sleepy during the day and will take up to 3 naps during the day.\par \par Sleep Studies:\par HST 5/23/2022: JULISSA 7.1, T90 10%\par HST 7/14/2022: JULISSA 7.0, T90 6.7%, liana SpO2 69% [Witnessed Apneas] : no witnessed sleep apnea [Unintentional Sleep while Active] : no unintentional sleep while active [Awakes with Headache] : no headache upon awakening [Recent  Weight Gain] : no recent weight gain [DIS] : no DIS [DMS] : no DMS [Unusual Sleep Behavior] : no unusual sleep behavior [Cataplexy] : no cataplexy [Sleep Paralysis] : no sleep paralysis [Hypnagogic Hallucinations] : no hallucinations when falling asleep [Hypnopompic Hallucinations] : no hallucinations when awakening [Lower Extremity Discomfort] : no lower extremity discomfort in evening or at bedtime

## 2022-08-02 NOTE — PHYSICAL EXAM
[General Appearance - Well Developed] : well developed [Normal Appearance] : normal appearance [Well Groomed] : well groomed [General Appearance - Well Nourished] : well nourished [Normal Conjunctiva] : the conjunctiva exhibited no abnormalities [Musculoskeletal - Swelling] : no joint swelling seen [Nail Clubbing] : no clubbing of the fingernails [Cyanosis, Localized] : no localized cyanosis [1+ Pitting] : 1+  pitting [Skin Color & Pigmentation] : normal skin color and pigmentation [Cranial Nerves] : cranial nerves 2-12 were intact [Oriented To Time, Place, And Person] : oriented to person, place, and time [Impaired Insight] : insight and judgment were intact [Affect] : the affect was normal [Mood] : the mood was normal [Neck Appearance] : the appearance of the neck was normal [] : the neck was supple [FreeTextEntry1] : + resting tremor of upper extremity

## 2022-08-02 NOTE — REVIEW OF SYSTEMS
[EDS: ESS=____] : daytime somnolence: ESS=[unfilled] [Fatigue] : fatigue [Snoring] : snoring [Obesity] : obesity

## 2022-08-09 ENCOUNTER — APPOINTMENT (OUTPATIENT)
Dept: SLEEP CENTER | Facility: CLINIC | Age: 82
End: 2022-08-09

## 2022-08-18 ENCOUNTER — OUTPATIENT (OUTPATIENT)
Dept: OUTPATIENT SERVICES | Facility: HOSPITAL | Age: 82
LOS: 1 days | End: 2022-08-18
Payer: MEDICARE

## 2022-08-18 ENCOUNTER — APPOINTMENT (OUTPATIENT)
Dept: SLEEP CENTER | Facility: CLINIC | Age: 82
End: 2022-08-18
Payer: MEDICARE

## 2022-08-18 PROCEDURE — G0400: CPT | Mod: 26,59

## 2022-08-18 PROCEDURE — 95807 SLEEP STUDY ATTENDED: CPT

## 2022-08-18 PROCEDURE — 95807 SLEEP STUDY ATTENDED: CPT | Mod: 26

## 2022-08-18 PROCEDURE — G0400: CPT

## 2022-08-18 PROCEDURE — G0400: CPT | Mod: 26

## 2022-09-01 DIAGNOSIS — G47.33 OBSTRUCTIVE SLEEP APNEA (ADULT) (PEDIATRIC): ICD-10-CM

## 2022-09-15 ENCOUNTER — RESULT REVIEW (OUTPATIENT)
Age: 82
End: 2022-09-15

## 2022-09-28 DIAGNOSIS — G47.33 OBSTRUCTIVE SLEEP APNEA (ADULT) (PEDIATRIC): ICD-10-CM

## 2022-10-28 ENCOUNTER — APPOINTMENT (OUTPATIENT)
Dept: NEUROLOGY | Facility: CLINIC | Age: 82
End: 2022-10-28

## 2022-11-21 ENCOUNTER — APPOINTMENT (OUTPATIENT)
Dept: NEUROLOGY | Facility: CLINIC | Age: 82
End: 2022-11-21

## 2023-04-11 ENCOUNTER — APPOINTMENT (OUTPATIENT)
Dept: NEUROLOGY | Facility: CLINIC | Age: 83
End: 2023-04-11

## 2023-08-28 ENCOUNTER — APPOINTMENT (OUTPATIENT)
Dept: NEUROLOGY | Facility: CLINIC | Age: 83
End: 2023-08-28
Payer: MEDICARE

## 2023-08-28 VITALS
HEIGHT: 63 IN | WEIGHT: 208 LBS | BODY MASS INDEX: 36.86 KG/M2 | DIASTOLIC BLOOD PRESSURE: 65 MMHG | SYSTOLIC BLOOD PRESSURE: 155 MMHG | HEART RATE: 64 BPM

## 2023-08-28 DIAGNOSIS — F41.9 ANXIETY DISORDER, UNSPECIFIED: ICD-10-CM

## 2023-08-28 DIAGNOSIS — I48.91 UNSPECIFIED ATRIAL FIBRILLATION: ICD-10-CM

## 2023-08-28 DIAGNOSIS — I67.9 CEREBROVASCULAR DISEASE, UNSPECIFIED: ICD-10-CM

## 2023-08-28 PROCEDURE — 99215 OFFICE O/P EST HI 40 MIN: CPT

## 2023-08-28 RX ORDER — DRONEDARONE 400 MG/1
400 TABLET, FILM COATED ORAL TWICE DAILY
Refills: 0 | Status: ACTIVE | COMMUNITY
Start: 2023-08-28

## 2023-08-28 RX ORDER — LOSARTAN POTASSIUM 50 MG/1
50 TABLET, FILM COATED ORAL
Refills: 0 | Status: ACTIVE | COMMUNITY

## 2023-08-28 RX ORDER — DILTIAZEM HYDROCHLORIDE 120 MG/1
120 TABLET ORAL
Refills: 0 | Status: ACTIVE | COMMUNITY

## 2023-08-28 NOTE — DATA REVIEWED
[de-identified] : NPT 3/2022: reviewed in chart. [de-identified] : Workup done: NPT, MRI. FDG-PET not done yet. MRI 2021: Advanced chronic microvascular ischemic changes. Right basal ganglia chronic lacunar infarct. No evidence for recent infarction. Stable right frontal calvarial lesion.

## 2023-08-28 NOTE — ASSESSMENT
[FreeTextEntry1] : Assessment: 82yo RH WW with ongoing dementia, likely mixed etiology, AD/Vascular. ET, with significant impairment of hands movements. MMSE low, <20. CDR 1-1.5. Deconditioned, ? if parkinsonism - tone is increased in UE with some cogwheel.  Diagnostic Impression: -Mixed dementia -ET -deconditioned  Plan: -Mov Dis for ET - consider L-DOPA? -USCD -resume Aricept and try to increase dose as tolerated -Refer to Dr. Mosley @ [facility], [address] [address], [phone]. -LIAD I recommended to pursue mental and physical activity and to adhere to Mediterranean type of diet.

## 2023-08-28 NOTE — HISTORY OF PRESENT ILLNESS
[FreeTextEntry1] : Mild COVID, tx with Paxlovid. COVID VACCINE FULL.  HPI: 84yo RH WW with HTN, Afib on A/c, here for management of dementia.  Mild CASTRO, no Tx.  PMH: Pt seen by Dr. Meadows, Elizabeth and Jacy in the past. per DR. Louie 2022: Dementia without behavioral disturbance, unspecified dementia type (294.20) (F03.90) Cerebrovascular small vessel disease (437.9) (I67.9) CASTRO (obstructive sleep apnea) (327.23) (G47.33) The patient is an 82 year old right handed woman with an episode of rapid forgetting in 2016 that was possibly associated with other mild chronic memory changes vs isolated incident, but since 2018 more clear cognitive decline noted primarily affecting memory. Personality more withdrawn, less sure of herself as well. B/l hand tremor is more longstanding since approx . As of  mild decline in IADLs. On neurobehavioral status testing  she had deficits in working memory, executive function, processing speed overall seemed ok. Conversational memory was good but on testing very impaired registration, dec recall, but it fully responded to cues without false positives and she was fully oriented. sematnic fluency however was worse than phonemic and she had anomia in conversation and testing that responded to cues. No apraxia aside from a meaningless gesture. neuropsychological testing 3'44 noted impairedments in processing speed, exec, praxis, vsp, but yair in learning and memory with rapid forgetting, also impaired semantics and worse semantic than phonemic fluency. Counseled that she has dementia of mild stage. Cogntiive pattern I had thought seemed dysexecutive and not amnestic, but language changes were noted, and neuropsyhcological eval did show amnestic memory pattern.. MRI showed severe microvascular ischemic disease as well as R BG infarct which we reviewed together and would be c/w vascular dementia, but possibly also has comorbid AD vs FTD spectrum-- AD suggested by memory though they noted with the semantic loss can't r/o FTD (svPPA). rec fdg pet scan to clarify. reviewed natural hx of mixed dementia, unpredictable course that can occur. reviewed fda approved drugs in AD, the lack of fda approved drugs in vascular dementia. Aricept he doestn think caused any benefit. Discussed that theres inc risk of GIB in conjunction with her xarelto. They preferred to discontinue which i agreed with, no worse off of it. if had comorbid AD would defer namenda use until mod-severe stage dementia. She snores, hst showed mild castro but with significant desat, needs tx Re: tremor, has been dx'ed as essential tremor which seems fitting, though some rest tremor was also noted. She doesn't have any parkinsonian gait or bradykinesia or visual hallucinations or rem sleep behavior disorder symptomst that would suggest she instead as dementia with lewy bodies. Essential tremor can also contribute to cognitive changes. She may have ckd which could also contribute. She has longstanding anxiety, worse in recent years, not on medication or in therapy. Discussed how that could worsen symptoms and needs to be addressed. Counseled on strategies for maintaining cognitive health. Counseled on safety concerns.  She had neuropsychological testing with Dr. Kern 3/30 and 22 and feedback session . had proc speed signif impaired, impaired exec function. ideomotor apraxia. signifc impaird spaital orientation judgment, mildly impaired visuoconsturction. impaired receptive language, diff with multistep directions, also some erros with simple steps. impaired naming, cues helped. mildly impaired semantics, semanc fluency worse than phonemic. flat learning curve. impaired recall, cues didn't help. impression was learning and mmeory were areas of most significant concern, amnestic profile. c/w dementia, c/w AD and vasc but vasc cant account for her cognitive profile. less likely svPPA since memory worse than language and the rapid forgetting suspect AD.  Issues Started 4-5y ago, with forgetfulness. Steady progression with other domains affected, to the current state.     -Memory: reduced STM -Speech: reduced fluency, with anomia -Orientation: poor -Praxis: reduced, can use simple utensils -Decision making/Executive fx/Multitasking: poor, reduced focusing.  -Sleep: ok, mild snoring and mild CASTRO, not treated; tends to take naps, feels quite fatigues all the times  -Appetite: ok, but limited by use of Mounjaro  -Motor symptoms: reduced walking, fatigue; no falls, no shuffling  -B/B: ok  -Psychiatric symptoms: some anxiety driven agitation  -Functional status: [place] Index of [place] in Activities of Daily Livin. Bathing/Showerin 2. Dressin 3. Toiletin 4. Transferrin 5. Continence: 1 6: Feedin  TOTAL: 6  Jackie-Alex Instrumental Activities of Daily Living: A. Ability to Use Telephone: 0 B. Shoppin C. Food Preparation: 0 D. Housekeepin E. Laundry: 0 F. Transportation: 0 G. Responsibility for Own Medications: 0 H: Ability to Handle Finances: 0  TOTAL: 0  CDR: 1.50  -Professional status: SW, Psychotx  PCP and other physicians: -PCP: Pati -Neuro: Jacy Johnson Wright  Workup done: NPT, MRI. FDG-PET not done yet. MRI : Advanced chronic microvascular ischemic changes. Right basal ganglia chronic lacunar infarct. No evidence for recent infarction. Stable right frontal calvarial lesion.

## 2023-08-28 NOTE — PHYSICAL EXAM
[General Appearance - Alert] : alert [General Appearance - In No Acute Distress] : in no acute distress [Impaired Insight] : insight and judgment were intact [Affect] : the affect was normal [Person] : oriented to person [Place] : oriented to place [Registration Intact] : recent registration memory intact [Concentration Intact] : normal concentrating ability [Visual Intact] : visual attention was ~T not ~L decreased [Naming Objects] : no difficulty naming common objects [Repeating Phrases] : no difficulty repeating a phrase [Writing A Sentence] : no difficulty writing a sentence [Comprehension] : comprehension intact [Reading] : reading intact [Past History] : adequate knowledge of personal past history [Vocabulary] : adequate range of vocabulary [Total Score ___ / 30] : the patient achieved a score of [unfilled] /30 [Date / Time ___ / 5] : date / time [unfilled] / 5 [Place ___ / 5] : place [unfilled] / 5 [Registration ___ / 3] : registration [unfilled] / 3 [Serial Sevens ___/5] : serial sevens [unfilled] / 5 [Naming 2 Objects ___ / 2] : naming two objects [unfilled] / 2 [Repeating a Sentence ___ / 1] : repeating a sentence [unfilled] / 1 [Writing a Sentence ___ / 1] : write sentence [unfilled] / 1 [3-stage Verbal Command ___ / 3] : three-stage verbal command [unfilled] / 3 [Written Command ___ / 1] : written command [unfilled] / 1 [Copy a Design ___ / 1] : copy a design [unfilled] / 1 [Recall ___ / 3] : recall [unfilled] / 3 [Cranial Nerves Optic (II)] : visual acuity intact bilaterally,  visual fields full to confrontation, pupils equal round and reactive to light [Cranial Nerves Oculomotor (III)] : extraocular motion intact [Cranial Nerves Trigeminal (V)] : facial sensation intact symmetrically [Cranial Nerves Facial (VII)] : face symmetrical [Cranial Nerves Vestibulocochlear (VIII)] : hearing was intact bilaterally [Cranial Nerves Glossopharyngeal (IX)] : tongue and palate midline [Cranial Nerves Accessory (XI - Cranial And Spinal)] : head turning and shoulder shrug symmetric [Cranial Nerves Hypoglossal (XII)] : there was no tongue deviation with protrusion [Motor Strength] : muscle strength was normal in all four extremities [No Muscle Atrophy] : normal bulk in all four extremities [Motor Handedness Right-Handed] : the patient is right hand dominant [Sensation Tactile Decrease] : light touch was intact [Balance] : balance was intact [2+] : Brachioradialis left 2+ [1+] : Ankle jerk left 1+ [Sclera] : the sclera and conjunctiva were normal [PERRL With Normal Accommodation] : pupils were equal in size, round, reactive to light, with normal accommodation [Extraocular Movements] : extraocular movements were intact [No APD] : no afferent pupillary defect [No ROSALINDA] : no internuclear ophthalmoplegia [Outer Ear] : the ears and nose were normal in appearance [Neck Appearance] : the appearance of the neck was normal [Edema] : there was no peripheral edema [] : no rash [Time] : disoriented to time [Short Term Intact] : short term memory impaired [Remote Intact] : remote memory impaired [Fluency] : fluency not intact [Current Events] : inadequate knowledge of current events [Motor Strength Upper Extremities Bilaterally] : strength was normal in both upper extremities [Motor Strength Lower Extremities Bilaterally] : strength was normal in both lower extremities [Romberg's Sign] : Romberg's sign was negtive [Past-pointing] : there was no past-pointing [Tremor] : no tremor present [Plantar Reflex Right Only] : normal on the right [Plantar Reflex Left Only] : normal on the left [FreeTextEntry4] : Mental Status Exam Presidents: dez Alternating Pattern: ok Spiral: ok Clock: 1/3, missing numbers, can read clock Repetition: ok R/L discrimination on self and examiner: ok Cross-line commands: no, slow Praxis: limited. Drawing/writing affected by tremor. No micrographia. [FreeTextEntry6] : bilat hands tremor, R>L; increase tremor in L hand with gait; diffuse paratonia and possible cogwheel in L>R hands. [FreeTextEntry8] : slow pace, short stride, no shuffle; pivots in 3-4 steps, march ok.

## 2023-09-06 ENCOUNTER — TRANSCRIPTION ENCOUNTER (OUTPATIENT)
Age: 83
End: 2023-09-06

## 2023-09-07 RX ORDER — DONEPEZIL HYDROCHLORIDE 5 MG/1
5 TABLET ORAL DAILY
Qty: 30 | Refills: 2 | Status: DISCONTINUED | COMMUNITY
Start: 2023-08-28 | End: 2023-09-07

## 2023-09-08 ENCOUNTER — TRANSCRIPTION ENCOUNTER (OUTPATIENT)
Age: 83
End: 2023-09-08

## 2023-10-13 ENCOUNTER — APPOINTMENT (OUTPATIENT)
Dept: NEUROLOGY | Facility: CLINIC | Age: 83
End: 2023-10-13
Payer: MEDICARE

## 2023-10-13 VITALS
WEIGHT: 202 LBS | HEART RATE: 61 BPM | DIASTOLIC BLOOD PRESSURE: 75 MMHG | BODY MASS INDEX: 35.79 KG/M2 | HEIGHT: 63 IN | SYSTOLIC BLOOD PRESSURE: 149 MMHG

## 2023-10-13 DIAGNOSIS — G25.0 ESSENTIAL TREMOR: ICD-10-CM

## 2023-10-13 PROCEDURE — 99215 OFFICE O/P EST HI 40 MIN: CPT

## 2023-10-19 ENCOUNTER — APPOINTMENT (OUTPATIENT)
Dept: NEUROLOGY | Facility: CLINIC | Age: 83
End: 2023-10-19
Payer: MEDICARE

## 2023-10-19 VITALS
HEART RATE: 60 BPM | BODY MASS INDEX: 34.91 KG/M2 | DIASTOLIC BLOOD PRESSURE: 76 MMHG | HEIGHT: 63 IN | SYSTOLIC BLOOD PRESSURE: 128 MMHG | WEIGHT: 197 LBS

## 2023-10-19 PROCEDURE — 99215 OFFICE O/P EST HI 40 MIN: CPT

## 2023-10-19 RX ORDER — TIRZEPATIDE 10 MG/.5ML
10 INJECTION, SOLUTION SUBCUTANEOUS
Refills: 0 | Status: ACTIVE | COMMUNITY

## 2023-12-04 ENCOUNTER — TRANSCRIPTION ENCOUNTER (OUTPATIENT)
Age: 83
End: 2023-12-04

## 2023-12-04 RX ORDER — PROPRANOLOL HYDROCHLORIDE 20 MG/1
20 TABLET ORAL
Qty: 180 | Refills: 3 | Status: ACTIVE | COMMUNITY
Start: 2023-10-20 | End: 1900-01-01

## 2024-02-24 ENCOUNTER — TRANSCRIPTION ENCOUNTER (OUTPATIENT)
Age: 84
End: 2024-02-24

## 2024-02-26 ENCOUNTER — TRANSCRIPTION ENCOUNTER (OUTPATIENT)
Age: 84
End: 2024-02-26

## 2024-03-04 ENCOUNTER — TRANSCRIPTION ENCOUNTER (OUTPATIENT)
Age: 84
End: 2024-03-04

## 2024-03-06 ENCOUNTER — TRANSCRIPTION ENCOUNTER (OUTPATIENT)
Age: 84
End: 2024-03-06

## 2024-03-08 ENCOUNTER — TRANSCRIPTION ENCOUNTER (OUTPATIENT)
Age: 84
End: 2024-03-08

## 2024-04-21 ENCOUNTER — NON-APPOINTMENT (OUTPATIENT)
Age: 84
End: 2024-04-21

## 2024-04-21 ENCOUNTER — APPOINTMENT (OUTPATIENT)
Dept: ORTHOPEDIC SURGERY | Facility: CLINIC | Age: 84
End: 2024-04-21
Payer: MEDICARE

## 2024-04-21 DIAGNOSIS — M48.061 SPINAL STENOSIS, LUMBAR REGION WITHOUT NEUROGENIC CLAUDICATION: ICD-10-CM

## 2024-04-21 DIAGNOSIS — E11.9 TYPE 2 DIABETES MELLITUS W/OUT COMPLICATIONS: ICD-10-CM

## 2024-04-21 PROCEDURE — 72100 X-RAY EXAM L-S SPINE 2/3 VWS: CPT

## 2024-04-21 PROCEDURE — 73521 X-RAY EXAM HIPS BI 2 VIEWS: CPT

## 2024-04-21 PROCEDURE — 99203 OFFICE O/P NEW LOW 30 MIN: CPT

## 2024-04-21 NOTE — ASSESSMENT
[FreeTextEntry1] : Will start her on Medrol and physical therapy Discussed the role of home stretching and walking Reviewed the plan with her  Follow-up in 3 weeks spine team in [place]

## 2024-04-21 NOTE — IMAGING
[FreeTextEntry1] : Multilevel degenerative changes worse at 4-5 with grade 1 spondylolisthesis [de-identified] : normal

## 2024-04-21 NOTE — HISTORY OF PRESENT ILLNESS
[7] : 7 [de-identified] :  04/21/2024: Pt is an 84-year-old female presenting of lower back pain. Pain started 04/12/24. No previous injury. Pain radiates towards the left thigh. WB supportive shoes. Tylenol for pain.   pmh Dementia and afib [] : no [FreeTextEntry1] : lower back

## 2024-05-02 ENCOUNTER — APPOINTMENT (OUTPATIENT)
Dept: ORTHOPEDIC SURGERY | Facility: CLINIC | Age: 84
End: 2024-05-02
Payer: MEDICARE

## 2024-05-02 VITALS — WEIGHT: 197 LBS | BODY MASS INDEX: 34.91 KG/M2 | HEIGHT: 63 IN

## 2024-05-02 DIAGNOSIS — M54.16 RADICULOPATHY, LUMBAR REGION: ICD-10-CM

## 2024-05-02 DIAGNOSIS — M51.36 OTHER INTERVERTEBRAL DISC DEGENERATION, LUMBAR REGION: ICD-10-CM

## 2024-05-02 DIAGNOSIS — M43.16 SPONDYLOLISTHESIS, LUMBAR REGION: ICD-10-CM

## 2024-05-02 PROCEDURE — 20553 NJX 1/MLT TRIGGER POINTS 3/>: CPT

## 2024-05-02 PROCEDURE — 99214 OFFICE O/P EST MOD 30 MIN: CPT | Mod: 25

## 2024-05-06 NOTE — HISTORY OF PRESENT ILLNESS
[7] : 7 [de-identified] : 5/2/24: 85 yo F presenting with low back pain that started 04/12/2024. Radiates into left posterior thigh. Went to [facility]; had xrs taken. Was prescribed MDP, provided temporary relief. Pain still present since initial onset. Yesterday had flare up. Pain in left thigh has subsided.   pmhx: on Xarelto (afib),

## 2024-05-06 NOTE — ASSESSMENT
[FreeTextEntry1] : 83 y/o F with acute low back pain with intermittent LLE radiculopathy. There's a slip at L4-5, dextroscoliosis and diffuse spondylosis seen on xr's. No weakness or red flags on exam. Nsaids and MDP provided temporary relief.   - Gave b/l lumbar TPI today, tolerated well.  - Encouraged attending PT and/or home stretching/exercising.  - If symptoms persist despite these measures, will obtain MRI and refer to pain mgmt.

## 2024-05-06 NOTE — IMAGING
[Facet arthropathy] : Facet arthropathy [Disc space narrowing] : Disc space narrowing [Scoliosis] : Scoliosis [Spondylolithesis] : Spondylolithesis [AP] : anteroposterior [There are no fractures, subluxations or dislocations. No significant abnormalities are seen] : There are no fractures, subluxations or dislocations. No significant abnormalities are seen [de-identified] : L Spine Inspection: No defects or deformities Palpation: No tenderness or spasms in b/l lumbar paraspinal musculature ROM: diminished in all planes Strength: 5/5 b/l hip flexors, knee extensors, ankle dorsiflexors, EHL, ankle plantarflexors Neuro: Sensation LT I Negative b/l SLR Toe and heal walk intact Gait mildly antalgic  [FreeTextEntry1] : L4-5 Grade I spondy

## 2024-05-07 ENCOUNTER — APPOINTMENT (OUTPATIENT)
Dept: NEUROLOGY | Facility: CLINIC | Age: 84
End: 2024-05-07
Payer: MEDICARE

## 2024-05-07 VITALS
WEIGHT: 197 LBS | HEIGHT: 63 IN | SYSTOLIC BLOOD PRESSURE: 160 MMHG | DIASTOLIC BLOOD PRESSURE: 77 MMHG | HEART RATE: 60 BPM | BODY MASS INDEX: 34.91 KG/M2

## 2024-05-07 DIAGNOSIS — G25.0 ESSENTIAL TREMOR: ICD-10-CM

## 2024-05-07 PROCEDURE — 99214 OFFICE O/P EST MOD 30 MIN: CPT

## 2024-05-07 NOTE — PHYSICAL EXAM
[Person] : oriented to person [Place] : oriented to place [Time] : disoriented to time [Short Term Intact] : short term memory impaired [Registration Intact] : recent registration memory intact [Naming Objects] : no difficulty naming common objects [Fluency] : fluency intact [Cranial Nerves Optic (II)] : visual acuity intact bilaterally,  visual fields full to confrontation, pupils equal round and reactive to light [Cranial Nerves Oculomotor (III)] : extraocular motion intact [Cranial Nerves Trigeminal (V)] : facial sensation intact symmetrically [Cranial Nerves Facial (VII)] : face symmetrical [Cranial Nerves Vestibulocochlear (VIII)] : hearing was intact bilaterally [Cranial Nerves Glossopharyngeal (IX)] : tongue and palate midline [Cranial Nerves Accessory (XI - Cranial And Spinal)] : head turning and shoulder shrug symmetric [Motor Strength] : muscle strength was normal in all four extremities [Motor Handedness Right-Handed] : the patient is right hand dominant [Sensation Tactile Decrease] : light touch was intact [Sensation Vibration Decrease] : vibration was intact [Dysdiadochokinesia Bilaterally] : not present [Coordination - Dysmetria Impaired Finger-to-Nose Bilateral] : not present [1+] : Brachioradialis left 1+ [0] : Ankle jerk left 0 [Plantar Reflex Right Only] : normal on the right [Plantar Reflex Left Only] : normal on the left [FreeTextEntry4] : Knew month, not year. Did not know president, 0/3 delayed recall.  [FreeTextEntry1] : Facial expression and voice were normal.  Extraocular movements were intact with normal saccades, smooth pursuit, and no square wave jerks seen.  Tone was normal throughout, even with activation. Shoulder shrug was symmetric.  Rapid alternating movements, finger tap, foot tap, and toe tap all were normal bilaterally. She got up from the chair without using her arms.  Gait was normal based and steady with slightly reduced stride length.  Armswing were symmetric.  Poultice was negative.  She had a significant kinetic tremor, worse on the right.  This was evident for spiral drawing with a clear axis as well as water pouring.  She was unable to write.  She also had a moderate postural tremor, again worse on the right.  She had intermittent resting tremors of either hand, perhaps slightly more often on the right.

## 2024-05-07 NOTE — HISTORY OF PRESENT ILLNESS
[FreeTextEntry1] : The patient is an 84-year-old right-handed woman who comes referred by Dr. Olivas for evaluation of tremors.  She had seen him for worsening memory.  In addition, she has had tremor of both hands for at least 4 decades, though this has been getting worse with some rest tremor component lately.  It affects her right more than her left hand.  She has no shaking of her voice, head, or legs.  She does not know if alcohol makes it better.  1. Was evaluated for FUS, decided not want it.  2. Tried propranolol 20 mg bid, no difference. The no changes in her voice quality and no change in facial expression.  She has no drooling or dysphagia.  She has no trouble turning over in bed.  Tremor affects writing, eating, and other ADLs.  Her walking is intact with no falls. 3. Her memory is poor. On rivastigmine patch 9.5 mg, no side effects,   She has no depression.  She does have some anxiety about the diagnosis of cognitive problems.  She has no hallucinations and no history of acting out her dreams.  She has no constipation.  She does feel dizzy in the morning.  Her mother had tremors without a clear diagnosis. 4. Still on xarelto

## 2024-05-07 NOTE — DISCUSSION/SUMMARY
[FreeTextEntry1] : In summary, the patient is a 83-year-old right-handed woman with cognitive difficulties and a tremor of the hands the past 40 years.  Examination was significant for right greater than left kinetic and postural tremor with an occasional and variable resting tremor component.  She had no bradykinesia or rigidity.  Overall, the history examination is indeed most consistent with essential tremor.  Other than the rest tremor, which can be seen in essential tremor, there were no findings that would point to a parkinsonian disorder.  1. As for treatment, she is disabled by the symptoms that she cannot eat by herself.   2. Propranolol did not help. Still on xarelto, so won't use primidone. 3. Focused ultrasound is a possible treatment for essential tremors, but she wanted to defer 4. Pietro increase rivastigmine to 13.4 mg.   We discussed the above impression, plan and recommendations during the visit. Counseling represented more then 50% of the 30 minute visit time.

## 2024-05-24 ENCOUNTER — TRANSCRIPTION ENCOUNTER (OUTPATIENT)
Age: 84
End: 2024-05-24

## 2024-06-17 ENCOUNTER — APPOINTMENT (OUTPATIENT)
Dept: NEUROLOGY | Facility: CLINIC | Age: 84
End: 2024-06-17
Payer: MEDICARE

## 2024-06-17 VITALS
DIASTOLIC BLOOD PRESSURE: 80 MMHG | HEART RATE: 62 BPM | HEIGHT: 63 IN | SYSTOLIC BLOOD PRESSURE: 169 MMHG | BODY MASS INDEX: 34.91 KG/M2 | WEIGHT: 197 LBS

## 2024-06-17 DIAGNOSIS — F03.B0 UNSPECIFIED DEMENTIA, MODERATE, WITHOUT BEHAVIORAL DISTURBANCE, PSYCHOTIC DISTURBANCE, MOOD DISTURBANCE, AND ANXIETY: ICD-10-CM

## 2024-06-17 DIAGNOSIS — R29.818 OTHER SYMPTOMS AND SIGNS INVOLVING THE NERVOUS SYSTEM: ICD-10-CM

## 2024-06-17 PROCEDURE — G2211 COMPLEX E/M VISIT ADD ON: CPT

## 2024-06-17 PROCEDURE — 99214 OFFICE O/P EST MOD 30 MIN: CPT

## 2024-06-17 RX ORDER — CARBIDOPA AND LEVODOPA 25; 100 MG/1; MG/1
25-100 TABLET, EXTENDED RELEASE ORAL
Qty: 60 | Refills: 2 | Status: ACTIVE | COMMUNITY
Start: 2024-06-17 | End: 1900-01-01

## 2024-06-17 RX ORDER — HYDROCHLOROTHIAZIDE 12.5 MG/1
TABLET ORAL
Refills: 0 | Status: DISCONTINUED | COMMUNITY
End: 2024-06-17

## 2024-06-17 RX ORDER — GALANTAMINE 16 MG/1
16 CAPSULE, EXTENDED RELEASE ORAL DAILY
Qty: 30 | Refills: 3 | Status: DISCONTINUED | COMMUNITY
Start: 2023-09-07 | End: 2024-06-17

## 2024-06-17 RX ORDER — DAPAGLIFLOZIN 5 MG/1
5 TABLET, FILM COATED ORAL DAILY
Refills: 0 | Status: ACTIVE | COMMUNITY
Start: 2024-06-17

## 2024-06-17 RX ORDER — RIVASTIGMINE 9.5 MG/24H
9.5 PATCH, EXTENDED RELEASE TRANSDERMAL DAILY
Qty: 30 | Refills: 3 | Status: ACTIVE | COMMUNITY
Start: 2024-02-21

## 2024-06-17 RX ORDER — METHYLPREDNISOLONE 4 MG/1
4 TABLET ORAL
Qty: 1 | Refills: 0 | Status: DISCONTINUED | COMMUNITY
Start: 2024-04-21 | End: 2024-06-17

## 2024-06-17 RX ORDER — TIRZEPATIDE 5 MG/.5ML
5 INJECTION, SOLUTION SUBCUTANEOUS
Refills: 0 | Status: DISCONTINUED | COMMUNITY
Start: 2023-08-28 | End: 2024-06-17

## 2024-06-17 RX ORDER — RIVAROXABAN 15 MG/1
15 TABLET, FILM COATED ORAL
Refills: 0 | Status: ACTIVE | COMMUNITY

## 2024-06-17 NOTE — PHYSICAL EXAM
[General Appearance - Alert] : alert [General Appearance - In No Acute Distress] : in no acute distress [Impaired Insight] : insight and judgment were intact [Affect] : the affect was normal [Person] : oriented to person [Place] : oriented to place [Time] : disoriented to time [Short Term Intact] : short term memory impaired [Remote Intact] : remote memory impaired [Registration Intact] : recent registration memory intact [Concentration Intact] : normal concentrating ability [Visual Intact] : visual attention was ~T not ~L decreased [Naming Objects] : no difficulty naming common objects [Repeating Phrases] : no difficulty repeating a phrase [Writing A Sentence] : no difficulty writing a sentence [Fluency] : fluency not intact [Comprehension] : comprehension intact [Reading] : reading intact [Current Events] : inadequate knowledge of current events [Past History] : adequate knowledge of personal past history [Vocabulary] : adequate range of vocabulary [Total Score ___ / 30] : the patient achieved a score of [unfilled] /30 [Date / Time ___ / 5] : date / time [unfilled] / 5 [Place ___ / 5] : place [unfilled] / 5 [Registration ___ / 3] : registration [unfilled] / 3 [Serial Sevens ___/5] : serial sevens [unfilled] / 5 [Naming 2 Objects ___ / 2] : naming two objects [unfilled] / 2 [Repeating a Sentence ___ / 1] : repeating a sentence [unfilled] / 1 [Writing a Sentence ___ / 1] : write sentence [unfilled] / 1 [3-stage Verbal Command ___ / 3] : three-stage verbal command [unfilled] / 3 [Written Command ___ / 1] : written command [unfilled] / 1 [Copy a Design ___ / 1] : copy a design [unfilled] / 1 [Recall ___ / 3] : recall [unfilled] / 3 [Cranial Nerves Optic (II)] : visual acuity intact bilaterally,  visual fields full to confrontation, pupils equal round and reactive to light [Cranial Nerves Oculomotor (III)] : extraocular motion intact [Cranial Nerves Trigeminal (V)] : facial sensation intact symmetrically [Cranial Nerves Facial (VII)] : face symmetrical [Cranial Nerves Vestibulocochlear (VIII)] : hearing was intact bilaterally [Cranial Nerves Glossopharyngeal (IX)] : tongue and palate midline [Cranial Nerves Accessory (XI - Cranial And Spinal)] : head turning and shoulder shrug symmetric [Cranial Nerves Hypoglossal (XII)] : there was no tongue deviation with protrusion [Motor Strength] : muscle strength was normal in all four extremities [No Muscle Atrophy] : normal bulk in all four extremities [Motor Handedness Right-Handed] : the patient is right hand dominant [Motor Strength Upper Extremities Bilaterally] : strength was normal in both upper extremities [Motor Strength Lower Extremities Bilaterally] : strength was normal in both lower extremities [Sensation Tactile Decrease] : light touch was intact [Romberg's Sign] : Romberg's sign was negtive [Balance] : balance was intact [Past-pointing] : there was no past-pointing [Tremor] : no tremor present [2+] : Brachioradialis left 2+ [1+] : Ankle jerk left 1+ [Plantar Reflex Right Only] : normal on the right [Plantar Reflex Left Only] : normal on the left [FreeTextEntry4] : Mental Status Exam Presidents: dez Alternating Pattern: ok Spiral: ok Clock: 1/3, missing numbers, can read clock Repetition: ok R/L discrimination on self and examiner: ok Cross-line commands: no, slow Praxis: limited. Drawing/writing affected by tremor. No micrographia. [FreeTextEntry5] : Some hypomimia [FreeTextEntry6] : bilat hands tremor, R>L; increase tremor in L hand with gait; diffuse paratonia and possible cogwheel in L>R hands. [FreeTextEntry8] : slow pace, short stride, no shuffle; pivots in 3-4 steps, march ok. [Sclera] : the sclera and conjunctiva were normal [PERRL With Normal Accommodation] : pupils were equal in size, round, reactive to light, with normal accommodation [Extraocular Movements] : extraocular movements were intact [No APD] : no afferent pupillary defect [No ROSALINDA] : no internuclear ophthalmoplegia [Outer Ear] : the ears and nose were normal in appearance [Neck Appearance] : the appearance of the neck was normal [Edema] : there was no peripheral edema [] : no rash

## 2024-06-17 NOTE — HISTORY OF PRESENT ILLNESS
[FreeTextEntry1] : Mild COVID, tx with Paxlovid. COVID VACCINE FULL.  [id number]: Since last seen: -continues to sleep a lot and eats well -reducing Rivastigmine and plan to DC, due to ineffective and possible rash/itch -motor ok, but very sedentary -seldom irritability, but manageable -goes to day programs @[facility] 2/week and another group a 3rd time -  tries to keep her engaged -ADL ok, but needs to be monitored; can be left alone.   HPI: 82yo RH WW with HTN, Afib on A/c, here for management of dementia.  Mild CASTRO, no Tx.  PMH: Pt seen by Dr. Meadows, Elizabeth and Jacy in the past. per DR. Louie 2022: Dementia without behavioral disturbance, unspecified dementia type (294.20) (F03.90) Cerebrovascular small vessel disease (437.9) (I67.9) CASTRO (obstructive sleep apnea) (327.23) (G47.33) The patient is an 82 year old right handed woman with an episode of rapid forgetting in  that was possibly associated with other mild chronic memory changes vs isolated incident, but since 2018 more clear cognitive decline noted primarily affecting memory. Personality more withdrawn, less sure of herself as well. B/l hand tremor is more longstanding since approx . As of  mild decline in IADLs. On neurobehavioral status testing  she had deficits in working memory, executive function, processing speed overall seemed ok. Conversational memory was good but on testing very impaired registration, dec recall, but it fully responded to cues without false positives and she was fully oriented. sematnic fluency however was worse than phonemic and she had anomia in conversation and testing that responded to cues. No apraxia aside from a meaningless gesture. neuropsychological testing 3'44 noted impairedments in processing speed, exec, praxis, vsp, but yair in learning and memory with rapid forgetting, also impaired semantics and worse semantic than phonemic fluency. Counseled that she has dementia of mild stage. Cogntiive pattern I had thought seemed dysexecutive and not amnestic, but language changes were noted, and neuropsyhcological eval did show amnestic memory pattern.. MRI showed severe microvascular ischemic disease as well as R BG infarct which we reviewed together and would be c/w vascular dementia, but possibly also has comorbid AD vs FTD spectrum-- AD suggested by memory though they noted with the semantic loss can't r/o FTD (svPPA). rec fdg pet scan to clarify. reviewed natural hx of mixed dementia, unpredictable course that can occur. reviewed fda approved drugs in AD, the lack of fda approved drugs in vascular dementia. Aricept he doestn think caused any benefit. Discussed that theres inc risk of GIB in conjunction with her xarelto. They preferred to discontinue which i agreed with, no worse off of it. if had comorbid AD would defer namenda use until mod-severe stage dementia. She snores, hst showed mild castro but with significant desat, needs tx Re: tremor, has been dx'ed as essential tremor which seems fitting, though some rest tremor was also noted. She doesn't have any parkinsonian gait or bradykinesia or visual hallucinations or rem sleep behavior disorder symptomst that would suggest she instead as dementia with lewy bodies. Essential tremor can also contribute to cognitive changes. She may have ckd which could also contribute. She has longstanding anxiety, worse in recent years, not on medication or in therapy. Discussed how that could worsen symptoms and needs to be addressed. Counseled on strategies for maintaining cognitive health. Counseled on safety concerns.  She had neuropsychological testing with Dr. Kern 3/30 and 22 and feedback session . had proc speed signif impaired, impaired exec function. ideomotor apraxia. signifc impaird spaital orientation judgment, mildly impaired visuoconsturction. impaired receptive language, diff with multistep directions, also some erros with simple steps. impaired naming, cues helped. mildly impaired semantics, semanc fluency worse than phonemic. flat learning curve. impaired recall, cues didn't help. impression was learning and mmeory were areas of most significant concern, amnestic profile. c/w dementia, c/w AD and vasc but vasc cant account for her cognitive profile. less likely svPPA since memory worse than language and the rapid forgetting suspect AD.  Issues Started 4-5y ago, with forgetfulness. Steady progression with other domains affected, to the current state.     -Memory: reduced STM -Speech: reduced fluency, with anomia -Orientation: poor -Praxis: reduced, can use simple utensils -Decision making/Executive fx/Multitasking: poor, reduced focusing.  -Sleep: ok, mild snoring and mild CASTRO, not treated; tends to take naps, feels quite fatigues all the times  -Appetite: ok, but limited by use of Mounjaro  -Motor symptoms: reduced walking, fatigue; no falls, no shuffling  -B/B: ok  -Psychiatric symptoms: some anxiety driven agitation  -Functional status: [place] Index of [place] in Activities of Daily Livin. Bathing/Showerin 2. Dressin 3. Toiletin 4. Transferrin 5. Continence: 1 6: Feedin  TOTAL: 6  Jackie-Alex Instrumental Activities of Daily Living: A. Ability to Use Telephone: 0 B. Shoppin C. Food Preparation: 0 D. Housekeepin E. Laundry: 0 F. Transportation: 0 G. Responsibility for Own Medications: 0 H: Ability to Handle Finances: 0  TOTAL: 0  CDR: 1.50  -Professional status: SW, [employer]  PCP and other physicians: -PCP: Pati -Neuro: Jacy Johnson Wright  Workup done: NPT, MRI. FDG-PET not done yet. MRI : Advanced chronic microvascular ischemic changes. Right basal ganglia chronic lacunar infarct. No evidence for recent infarction. Stable right frontal calvarial lesion.

## 2024-06-17 NOTE — ASSESSMENT
[FreeTextEntry1] : Assessment: 85yo RH WW with ongoing dementia, likely mixed etiology. MMSE low, <20. CDR 2 Deconditioned, mild parkinsonism - tone is increased in UE with some cogwheel L>R, hypomimia. Some deconditioning, but ok. Pt is sufficiently engaged, participates in senior day programs.  Diagnostic Impression: -Mixed dementia -ET -deconditioned  Plan: -continue current meds -consider L-DOPA.

## 2024-06-17 NOTE — DATA REVIEWED
[de-identified] : NPT 3/2022: reviewed in chart. [de-identified] : Workup done: NPT, MRI. FDG-PET not done yet. MRI 2021: Advanced chronic microvascular ischemic changes. Right basal ganglia chronic lacunar infarct. No evidence for recent infarction. Stable right frontal calvarial lesion.

## 2024-07-03 ENCOUNTER — TRANSCRIPTION ENCOUNTER (OUTPATIENT)
Age: 84
End: 2024-07-03

## 2024-08-19 ENCOUNTER — TRANSCRIPTION ENCOUNTER (OUTPATIENT)
Age: 84
End: 2024-08-19

## 2024-09-09 ENCOUNTER — TRANSCRIPTION ENCOUNTER (OUTPATIENT)
Age: 84
End: 2024-09-09

## 2024-10-03 ENCOUNTER — TRANSCRIPTION ENCOUNTER (OUTPATIENT)
Age: 84
End: 2024-10-03

## 2024-10-16 ENCOUNTER — APPOINTMENT (OUTPATIENT)
Dept: NEUROLOGY | Facility: CLINIC | Age: 84
End: 2024-10-16

## 2024-11-22 ENCOUNTER — APPOINTMENT (OUTPATIENT)
Dept: NEUROLOGY | Facility: CLINIC | Age: 84
End: 2024-11-22

## 2024-12-30 ENCOUNTER — NON-APPOINTMENT (OUTPATIENT)
Age: 84
End: 2024-12-30